# Patient Record
Sex: MALE | Race: WHITE | NOT HISPANIC OR LATINO | Employment: OTHER | URBAN - METROPOLITAN AREA
[De-identification: names, ages, dates, MRNs, and addresses within clinical notes are randomized per-mention and may not be internally consistent; named-entity substitution may affect disease eponyms.]

---

## 2017-12-11 ENCOUNTER — ALLSCRIPTS OFFICE VISIT (OUTPATIENT)
Dept: OTHER | Facility: OTHER | Age: 77
End: 2017-12-11

## 2017-12-11 LAB
BILIRUB UR QL STRIP: NORMAL
CLARITY UR: NORMAL
COLOR UR: YELLOW
GLUCOSE (HISTORICAL): NORMAL
HGB UR QL STRIP.AUTO: NORMAL
KETONES UR STRIP-MCNC: NORMAL MG/DL
LEUKOCYTE ESTERASE UR QL STRIP: NORMAL
NITRITE UR QL STRIP: NORMAL
PH UR STRIP.AUTO: 5.5 [PH]
PROT UR STRIP-MCNC: NORMAL MG/DL
SP GR UR STRIP.AUTO: 1.02
UROBILINOGEN UR QL STRIP.AUTO: 0.2

## 2018-01-22 VITALS
WEIGHT: 161 LBS | BODY MASS INDEX: 25.88 KG/M2 | HEIGHT: 66 IN | DIASTOLIC BLOOD PRESSURE: 80 MMHG | SYSTOLIC BLOOD PRESSURE: 110 MMHG

## 2018-12-11 DIAGNOSIS — C61 MALIGNANT NEOPLASM OF PROSTATE (HCC): ICD-10-CM

## 2018-12-12 ENCOUNTER — OFFICE VISIT (OUTPATIENT)
Dept: UROLOGY | Facility: MEDICAL CENTER | Age: 78
End: 2018-12-12
Payer: MEDICARE

## 2018-12-12 VITALS
WEIGHT: 158 LBS | SYSTOLIC BLOOD PRESSURE: 110 MMHG | HEIGHT: 65 IN | HEART RATE: 103 BPM | DIASTOLIC BLOOD PRESSURE: 80 MMHG | BODY MASS INDEX: 26.33 KG/M2

## 2018-12-12 DIAGNOSIS — Z85.46 HISTORY OF PROSTATE CANCER: Primary | ICD-10-CM

## 2018-12-12 DIAGNOSIS — R39.15 URGENCY OF URINATION: ICD-10-CM

## 2018-12-12 LAB
SL AMB  POCT GLUCOSE, UA: NORMAL
SL AMB LEUKOCYTE ESTERASE,UA: NORMAL
SL AMB POCT BILIRUBIN,UA: NORMAL
SL AMB POCT BLOOD,UA: NORMAL
SL AMB POCT CLARITY,UA: CLEAR
SL AMB POCT COLOR,UA: YELLOW
SL AMB POCT KETONES,UA: NORMAL
SL AMB POCT NITRITE,UA: NORMAL
SL AMB POCT PH,UA: 6
SL AMB POCT SPECIFIC GRAVITY,UA: 1.01
SL AMB POCT URINE PROTEIN: NORMAL
SL AMB POCT UROBILINOGEN: 0.2

## 2018-12-12 PROCEDURE — 81003 URINALYSIS AUTO W/O SCOPE: CPT | Performed by: UROLOGY

## 2018-12-12 PROCEDURE — 99214 OFFICE O/P EST MOD 30 MIN: CPT | Performed by: UROLOGY

## 2018-12-12 RX ORDER — ASPIRIN 81 MG/1
TABLET ORAL
COMMUNITY

## 2018-12-12 RX ORDER — LOVASTATIN 20 MG/1
TABLET ORAL
COMMUNITY

## 2018-12-12 RX ORDER — FERROUS SULFATE 325(65) MG
65 TABLET ORAL
COMMUNITY

## 2018-12-12 RX ORDER — BUSPIRONE HYDROCHLORIDE 15 MG/1
15 TABLET ORAL 2 TIMES DAILY
Refills: 0 | COMMUNITY
Start: 2018-09-07

## 2018-12-12 RX ORDER — OXYBUTYNIN CHLORIDE 10 MG/1
10 TABLET, EXTENDED RELEASE ORAL
Qty: 90 TABLET | Refills: 3 | Status: SHIPPED | OUTPATIENT
Start: 2018-12-12 | End: 2019-03-14 | Stop reason: SINTOL

## 2018-12-12 RX ORDER — CYCLOSPORINE 0.5 MG/ML
EMULSION OPHTHALMIC
COMMUNITY

## 2018-12-12 RX ORDER — ATENOLOL 25 MG/1
TABLET ORAL
COMMUNITY

## 2018-12-12 RX ORDER — SERTRALINE HYDROCHLORIDE 100 MG/1
200 TABLET, FILM COATED ORAL
COMMUNITY
Start: 2018-09-12

## 2018-12-12 RX ORDER — TAMSULOSIN HYDROCHLORIDE 0.4 MG/1
CAPSULE ORAL
COMMUNITY
End: 2018-12-12 | Stop reason: SDUPTHER

## 2018-12-12 RX ORDER — SERTRALINE HYDROCHLORIDE 100 MG/1
200 TABLET, FILM COATED ORAL
COMMUNITY
Start: 2018-12-12 | End: 2018-12-12 | Stop reason: SDUPTHER

## 2018-12-12 RX ORDER — TAMSULOSIN HYDROCHLORIDE 0.4 MG/1
0.4 CAPSULE ORAL
Qty: 90 CAPSULE | Refills: 3 | Status: SHIPPED | OUTPATIENT
Start: 2018-12-12 | End: 2021-02-09 | Stop reason: SDUPTHER

## 2018-12-12 NOTE — PROGRESS NOTES
Assessment/Plan:  1  History of prostate cancer-PSA is 0 25 without any evidence of disease recurrence  2  Urinary urgency and frequency-the patient will be started on oxybutynin XL 10 mg p o  Daily and return in 3 months for measurement of postvoid residual volume uroflow min treat and cystoscopy  No problem-specific Assessment & Plan notes found for this encounter  Diagnoses and all orders for this visit:    History of prostate cancer  -     POCT urine dip auto non-scope  -     tamsulosin (FLOMAX) 0 4 mg; Take 1 capsule (0 4 mg total) by mouth daily with dinner    Urgency of urination  -     oxybutynin (DITROPAN-XL) 10 MG 24 hr tablet; Take 1 tablet (10 mg total) by mouth daily at bedtime  -     Cystoscopy; Future  -     Uroflow w/ post void residual; Future    Other orders  -     aspirin (ADULT ASPIRIN EC LOW STRENGTH) 81 mg EC tablet; Take by mouth  -     atenolol (TENORMIN) 25 mg tablet; Take by mouth  -     busPIRone (BUSPAR) 30 MG tablet; Take 30 mg by mouth 2 (two) times a day  -     Cholecalciferol 1000 units capsule; Take 3,000 Units by mouth  -     lovastatin (MEVACOR) 20 mg tablet; Take by mouth  -     cycloSPORINE (RESTASIS) 0 05 % ophthalmic emulsion; Apply to eye  -     sertraline (ZOLOFT) 100 mg tablet; Take 200 mg by mouth  -     Discontinue: tamsulosin (FLOMAX) 0 4 mg; Take by mouth  -     ferrous sulfate 325 (65 Fe) mg tablet; Take 65 mg by mouth  -     Discontinue: sertraline (ZOLOFT) 100 mg tablet; Take 200 mg by mouth          Subjective:      Patient ID: Daiana Marsh is a 66 y o  male  Chief complaint:  History of prostate cancer status post radiation therapy approximately 5 years ago  History of present illness: This is a 28-year-old male underwent radiation therapy for prostate cancer 5 years ago and returns for follow-up denying any bony pain abdominal pain unexplained weight loss dysuria or gross hematuria    The patient does note some urinary urgency and urge incontinence which is problematic to him  He does note some frequency urgency and weakening of the urinary stream   This has improved somewhat on tamsulosin with an AUA symptom score of 10  The patient does request medication for his urgency and occasional urge incontinence  The following portions of the patient's history were reviewed and updated as appropriate: allergies, current medications, past family history, past medical history, past social history, past surgical history and problem list     Review of Systems   Constitutional: Positive for fatigue  HENT: Negative  Respiratory: Negative  Cardiovascular: Negative  Gastrointestinal: Negative  Endocrine: Negative  Genitourinary: Positive for frequency and urgency  Musculoskeletal: Positive for myalgias  Neurological: Negative  Objective:      /80 (BP Location: Left arm, Patient Position: Sitting)   Pulse 103   Ht 5' 5" (1 651 m)   Wt 71 7 kg (158 lb)   BMI 26 29 kg/m²          Physical Exam   Constitutional: He is oriented to person, place, and time  He appears well-nourished  No distress  HENT:   Head: Atraumatic  Eyes: EOM are normal    Neck: Neck supple  Pulmonary/Chest: Effort normal  No respiratory distress  Abdominal: Soft  Neurological: He is alert and oriented to person, place, and time  Psychiatric: He has a normal mood and affect  His behavior is normal  Judgment and thought content normal    Vitals reviewed

## 2018-12-12 NOTE — LETTER
December 12, 2018     Carly Hui MD  [address] [id number]    Patient: Radha Tamayo   YOB: 1940   Date of Visit: 12/12/2018       Dear Dr Tucker Cunningham: Thank you for referring Radha Tamayo to me for evaluation  Below are my notes for this consultation  If you have questions, please do not hesitate to call me  I look forward to following your patient along with you  Sincerely,        Kecia Shabazz MD        CC: No Recipients  Kecia Shabazz MD  12/12/2018  4:59 PM  Sign at close encounter  Assessment/Plan:  1  History of prostate cancer-PSA is 0 25 without any evidence of disease recurrence  2  Urinary urgency and frequency-the patient will be started on oxybutynin XL 10 mg p o  Daily and return in 3 months for measurement of postvoid residual volume uroflow min treat and cystoscopy  No problem-specific Assessment & Plan notes found for this encounter  Diagnoses and all orders for this visit:    History of prostate cancer  -     POCT urine dip auto non-scope  -     tamsulosin (FLOMAX) 0 4 mg; Take 1 capsule (0 4 mg total) by mouth daily with dinner    Urgency of urination  -     oxybutynin (DITROPAN-XL) 10 MG 24 hr tablet; Take 1 tablet (10 mg total) by mouth daily at bedtime  -     Cystoscopy; Future  -     Uroflow w/ post void residual; Future    Other orders  -     aspirin (ADULT ASPIRIN EC LOW STRENGTH) 81 mg EC tablet; Take by mouth  -     atenolol (TENORMIN) 25 mg tablet; Take by mouth  -     busPIRone (BUSPAR) 30 MG tablet; Take 30 mg by mouth 2 (two) times a day  -     Cholecalciferol 1000 units capsule; Take 3,000 Units by mouth  -     lovastatin (MEVACOR) 20 mg tablet; Take by mouth  -     cycloSPORINE (RESTASIS) 0 05 % ophthalmic emulsion; Apply to eye  -     sertraline (ZOLOFT) 100 mg tablet; Take 200 mg by mouth  -     Discontinue: tamsulosin (FLOMAX) 0 4 mg; Take by mouth  -     ferrous sulfate 325 (65 Fe) mg tablet;  Take 65 mg by mouth  -     Discontinue: sertraline (ZOLOFT) 100 mg tablet; Take 200 mg by mouth          Subjective:      Patient ID: Elieser Esquivel is a 66 y o  male  Chief complaint:  History of prostate cancer status post radiation therapy approximately 5 years ago  History of present illness: This is a 77-year-old male underwent radiation therapy for prostate cancer 5 years ago and returns for follow-up denying any bony pain abdominal pain unexplained weight loss dysuria or gross hematuria  The patient does note some urinary urgency and urge incontinence which is problematic to him  He does note some frequency urgency and weakening of the urinary stream   This has improved somewhat on tamsulosin with an AUA symptom score of 10  The patient does request medication for his urgency and occasional urge incontinence  The following portions of the patient's history were reviewed and updated as appropriate: allergies, current medications, past family history, past medical history, past social history, past surgical history and problem list     Review of Systems   Constitutional: Positive for fatigue  HENT: Negative  Respiratory: Negative  Cardiovascular: Negative  Gastrointestinal: Negative  Endocrine: Negative  Genitourinary: Positive for frequency and urgency  Musculoskeletal: Positive for myalgias  Neurological: Negative  Objective:      /80 (BP Location: Left arm, Patient Position: Sitting)   Pulse 103   Ht 5' 5" (1 651 m)   Wt 71 7 kg (158 lb)   BMI 26 29 kg/m²           Physical Exam   Constitutional: He is oriented to person, place, and time  He appears well-nourished  No distress  HENT:   Head: Atraumatic  Eyes: EOM are normal    Neck: Neck supple  Pulmonary/Chest: Effort normal  No respiratory distress  Abdominal: Soft  Neurological: He is alert and oriented to person, place, and time  Psychiatric: He has a normal mood and affect   His behavior is normal  Judgment and thought content normal    Vitals reviewed

## 2019-03-06 RX ORDER — TADALAFIL 20 MG/1
TABLET ORAL
Refills: 0 | COMMUNITY
Start: 2019-01-08 | End: 2021-02-09

## 2019-03-14 ENCOUNTER — PROCEDURE VISIT (OUTPATIENT)
Dept: UROLOGY | Facility: MEDICAL CENTER | Age: 79
End: 2019-03-14
Payer: MEDICARE

## 2019-03-14 VITALS
DIASTOLIC BLOOD PRESSURE: 80 MMHG | WEIGHT: 154 LBS | SYSTOLIC BLOOD PRESSURE: 118 MMHG | BODY MASS INDEX: 25.66 KG/M2 | HEIGHT: 65 IN | HEART RATE: 104 BPM

## 2019-03-14 DIAGNOSIS — R39.15 URGENCY OF URINATION: Primary | ICD-10-CM

## 2019-03-14 DIAGNOSIS — Z85.46 HISTORY OF PROSTATE CANCER: ICD-10-CM

## 2019-03-14 LAB
POST-VOID RESIDUAL VOLUME, ML POC: 14 ML
SL AMB  POCT GLUCOSE, UA: NORMAL
SL AMB LEUKOCYTE ESTERASE,UA: NORMAL
SL AMB POCT BILIRUBIN,UA: NORMAL
SL AMB POCT BLOOD,UA: NORMAL
SL AMB POCT CLARITY,UA: CLEAR
SL AMB POCT COLOR,UA: YELLOW
SL AMB POCT KETONES,UA: NORMAL
SL AMB POCT NITRITE,UA: NORMAL
SL AMB POCT PH,UA: 5.5
SL AMB POCT SPECIFIC GRAVITY,UA: 1.02
SL AMB POCT URINE PROTEIN: NORMAL
SL AMB POCT UROBILINOGEN: 0.2

## 2019-03-14 PROCEDURE — 51798 US URINE CAPACITY MEASURE: CPT | Performed by: UROLOGY

## 2019-03-14 PROCEDURE — 52000 CYSTOURETHROSCOPY: CPT | Performed by: UROLOGY

## 2019-03-14 PROCEDURE — 81003 URINALYSIS AUTO W/O SCOPE: CPT | Performed by: UROLOGY

## 2019-03-14 PROCEDURE — 99213 OFFICE O/P EST LOW 20 MIN: CPT | Performed by: UROLOGY

## 2019-03-14 RX ORDER — SOLIFENACIN SUCCINATE 10 MG/1
10 TABLET, FILM COATED ORAL DAILY
Qty: 90 TABLET | Refills: 3 | Status: SHIPPED | OUTPATIENT
Start: 2019-03-14 | End: 2021-02-09 | Stop reason: SDUPTHER

## 2019-03-14 RX ORDER — SULFAMETHOXAZOLE AND TRIMETHOPRIM 800; 160 MG/1; MG/1
1 TABLET ORAL EVERY 12 HOURS SCHEDULED
Qty: 4 TABLET | Refills: 0 | Status: SHIPPED | OUTPATIENT
Start: 2019-03-14 | End: 2019-03-16

## 2019-03-14 NOTE — PROGRESS NOTES
Assessment/Plan:   1  Adenocarcinoma the prostate-PSA has been stable for many years and will be repeated in December of 2019  2  Urinary urgency-controlled with oxybutynin however the patient has side effects of dry mouth  VESIcare 10 mg p  o  daily will be ordered an oxybutynin discontinued  If VESIcare works well to control the patient's symptoms consideration towards discontinuing tamsulosin will take place  Diagnoses and all orders for this visit:    Urgency of urination  -     POCT Measure PVR  -     POCT urine dip auto non-scope  -     solifenacin (VESICARE) 10 MG tablet; Take 1 tablet (10 mg total) by mouth daily  -     sulfamethoxazole-trimethoprim (BACTRIM DS) 800-160 mg per tablet; Take 1 tablet by mouth every 12 (twelve) hours for 2 days  -     POCT Measure PVR; Future    History of prostate cancer  -     PSA Total, Diagnostic; Future  -     Comprehensive metabolic panel; Future          Subjective:     Patient ID: Adele Hatfield is a 78 y o  male  Chief complaint:  Urgency of urination, enlarged prostate gland     History of present illness: This is a 66-year-old male underwent radiation therapy for prostate cancer 5 years ago and returns for follow-up denying any bony pain abdominal pain unexplained weight loss dysuria or gross hematuria  The patient does note some urinary urgency and urge incontinence which is problematic to him  He does note some frequency urgency and weakening of the urinary stream   This has improved somewhat on tamsulosin with an AUA symptom score of 10  The patient does request medication for his urgency and occasional urge incontinence  The patient was placed on oxybutynin XL 10 mg p o  daily with good control of his urinary urgency and presents today for cystourethroscopy to evaluate the bladder outlet for possible Uro lift and rule out any intravesical abnormalities    The patient does complain of dry mouth from oxybutynin and has requested an alternative medication  Review of Systems   Constitutional: Negative  HENT: Negative  Eyes: Negative  Respiratory: Negative  Cardiovascular: Negative  Gastrointestinal: Negative  Endocrine: Negative  Genitourinary:        See HPI   Musculoskeletal: Positive for myalgias  Neurological: Negative  Psychiatric/Behavioral: Negative  Objective:     Physical Exam   Constitutional: He is oriented to person, place, and time  He appears well-developed and well-nourished  HENT:   Head: Normocephalic and atraumatic  Eyes: EOM are normal    Neck: Neck supple  Cardiovascular: Normal rate, regular rhythm and normal heart sounds  Pulmonary/Chest: Effort normal and breath sounds normal  No respiratory distress  Abdominal: Soft  He exhibits no distension  There is no tenderness  There is no guarding  Genitourinary: Penis normal    Neurological: He is alert and oriented to person, place, and time  Psychiatric: He has a normal mood and affect  His behavior is normal  Judgment and thought content normal    Vitals reviewed  Cystoscopy  Date/Time: 3/14/2019 2:13 PM  Performed by: Brody Ruiz MD  Authorized by: Brody Ruiz MD     Procedure details: cystoscopy    Patient tolerance: Patient tolerated the procedure well with no immediate complications    Additional Procedure Details: The patient was placed in lithotomy position his urethral meatus prepped with Betadine  2% lidocaine lubricant was placed indwelling in the urethral lumen for 5 minutes and cystourethroscopy took place  The anterior urethra was within normal limits without stricture lesion  The prostatic urethra revealed minimal bilobar enlargement of the lateral lobes of the prostate with visual occlusion of the bladder outlet being only mild  The patient's bladder however was moderately to severely trabeculated with multiple posterior and lateral wall diverticula of small volume    No intrinsic lesions or extrinsic mass compression was noted  Ureteral orifices were normal position and configuration bilaterally with clear efflux bilaterally  The patient tolerated the procedure well without complication

## 2019-03-14 NOTE — LETTER
March 14, 2019     Monty Murphy MD  [address] [id number]    Patient: Jannette Lemus   YOB: 1940   Date of Visit: 3/14/2019       Dear Dr Vivek Hurd: Thank you for referring Jannette Lemus to me for evaluation  Below are my notes for this consultation  If you have questions, please do not hesitate to call me  I look forward to following your patient along with you  Sincerely,        Alee Haynes MD        CC: No Recipients  Alee Haynes MD  3/14/2019  2:15 PM  Sign at close encounter  Assessment/Plan:   1  Adenocarcinoma the prostate-PSA has been stable for many years and will be repeated in December of 2019  2  Urinary urgency-controlled with oxybutynin however the patient has side effects of dry mouth  VESIcare 10 mg p  o  daily will be ordered an oxybutynin discontinued  If VESIcare works well to control the patient's symptoms consideration towards discontinuing tamsulosin will take place  Diagnoses and all orders for this visit:    Urgency of urination  -     POCT Measure PVR  -     POCT urine dip auto non-scope  -     solifenacin (VESICARE) 10 MG tablet; Take 1 tablet (10 mg total) by mouth daily  -     sulfamethoxazole-trimethoprim (BACTRIM DS) 800-160 mg per tablet; Take 1 tablet by mouth every 12 (twelve) hours for 2 days  -     POCT Measure PVR; Future    History of prostate cancer  -     PSA Total, Diagnostic; Future  -     Comprehensive metabolic panel; Future          Subjective:     Patient ID: Jannette Lemus is a 78 y o  male  Chief complaint:  Urgency of urination, enlarged prostate gland     History of present illness: This is a 77-year-old male underwent radiation therapy for prostate cancer 5 years ago and returns for follow-up denying any bony pain abdominal pain unexplained weight loss dysuria or gross hematuria  The patient does note some urinary urgency and urge incontinence which is problematic to him    He does note some frequency urgency and weakening of the urinary stream   This has improved somewhat on tamsulosin with an AUA symptom score of 10  The patient does request medication for his urgency and occasional urge incontinence  The patient was placed on oxybutynin XL 10 mg p o  daily with good control of his urinary urgency and presents today for cystourethroscopy to evaluate the bladder outlet for possible Uro lift and rule out any intravesical abnormalities  The patient does complain of dry mouth from oxybutynin and has requested an alternative medication  Review of Systems   Constitutional: Negative  HENT: Negative  Eyes: Negative  Respiratory: Negative  Cardiovascular: Negative  Gastrointestinal: Negative  Endocrine: Negative  Genitourinary:        See HPI   Musculoskeletal: Positive for myalgias  Neurological: Negative  Psychiatric/Behavioral: Negative  Objective:     Physical Exam   Constitutional: He is oriented to person, place, and time  He appears well-developed and well-nourished  HENT:   Head: Normocephalic and atraumatic  Eyes: EOM are normal    Neck: Neck supple  Cardiovascular: Normal rate, regular rhythm and normal heart sounds  Pulmonary/Chest: Effort normal and breath sounds normal  No respiratory distress  Abdominal: Soft  He exhibits no distension  There is no tenderness  There is no guarding  Genitourinary: Penis normal    Neurological: He is alert and oriented to person, place, and time  Psychiatric: He has a normal mood and affect  His behavior is normal  Judgment and thought content normal    Vitals reviewed  Cystoscopy  Date/Time: 3/14/2019 2:13 PM  Performed by: Vandana Lo MD  Authorized by: Vandana Lo MD     Procedure details: cystoscopy    Patient tolerance: Patient tolerated the procedure well with no immediate complications    Additional Procedure Details:  The patient was placed in lithotomy position his urethral meatus prepped with Betadine  2% lidocaine lubricant was placed indwelling in the urethral lumen for 5 minutes and cystourethroscopy took place  The anterior urethra was within normal limits without stricture lesion  The prostatic urethra revealed minimal bilobar enlargement of the lateral lobes of the prostate with visual occlusion of the bladder outlet being only mild  The patient's bladder however was moderately to severely trabeculated with multiple posterior and lateral wall diverticula of small volume  No intrinsic lesions or extrinsic mass compression was noted  Ureteral orifices were normal position and configuration bilaterally with clear efflux bilaterally  The patient tolerated the procedure well without complication

## 2019-12-13 RX ORDER — ALPRAZOLAM 0.25 MG/1
0.25 TABLET ORAL 2 TIMES DAILY PRN
COMMUNITY
Start: 2019-11-13

## 2019-12-18 ENCOUNTER — OFFICE VISIT (OUTPATIENT)
Dept: UROLOGY | Facility: MEDICAL CENTER | Age: 79
End: 2019-12-18
Payer: MEDICARE

## 2019-12-18 VITALS
HEIGHT: 65 IN | DIASTOLIC BLOOD PRESSURE: 70 MMHG | SYSTOLIC BLOOD PRESSURE: 120 MMHG | WEIGHT: 158 LBS | HEART RATE: 100 BPM | BODY MASS INDEX: 26.33 KG/M2

## 2019-12-18 DIAGNOSIS — R39.15 URGENCY OF URINATION: Primary | ICD-10-CM

## 2019-12-18 DIAGNOSIS — Z85.46 HISTORY OF PROSTATE CANCER: ICD-10-CM

## 2019-12-18 LAB
POST-VOID RESIDUAL VOLUME, ML POC: 7 ML
SL AMB  POCT GLUCOSE, UA: ABNORMAL
SL AMB LEUKOCYTE ESTERASE,UA: ABNORMAL
SL AMB POCT BILIRUBIN,UA: ABNORMAL
SL AMB POCT BLOOD,UA: ABNORMAL
SL AMB POCT CLARITY,UA: CLEAR
SL AMB POCT COLOR,UA: YELLOW
SL AMB POCT KETONES,UA: ABNORMAL
SL AMB POCT NITRITE,UA: ABNORMAL
SL AMB POCT PH,UA: 5
SL AMB POCT SPECIFIC GRAVITY,UA: 1.02
SL AMB POCT URINE PROTEIN: 30
SL AMB POCT UROBILINOGEN: 0.2

## 2019-12-18 PROCEDURE — 81003 URINALYSIS AUTO W/O SCOPE: CPT | Performed by: UROLOGY

## 2019-12-18 PROCEDURE — 51798 US URINE CAPACITY MEASURE: CPT | Performed by: UROLOGY

## 2019-12-18 PROCEDURE — 99214 OFFICE O/P EST MOD 30 MIN: CPT | Performed by: UROLOGY

## 2019-12-18 NOTE — PROGRESS NOTES
Assessment/Plan: 1  No carcinoma of the prostate -PSA stable with no evidence of recurrence    2  Urinary urgency- controlled on VESIcare 10 mg p o  Daily  AUA symptom score 10 with nocturia twice nightly and 3/5 urgency and frequency  No side effects from VESIcare  The patient will temporarily stop tamsulosin/Flomax to see if his stream changes at all  If he has no problems off of tamsulosin he will remain off of that otherwise he will reinstituted if his urinary stream becomes weaker  No problem-specific Assessment & Plan notes found for this encounter  Diagnoses and all orders for this visit:    Urgency of urination  -     POCT urine dip auto non-scope  -     POCT Measure PVR    History of prostate cancer  -     POCT urine dip auto non-scope  -     POCT Measure PVR    Other orders  -     ALPRAZolam (XANAX) 0 25 mg tablet; Take 0 25 mg by mouth 2 (two) times a day as needed          Subjective:      Patient ID: Blaise Esquivel is a 78 y o  male  HPI    68-year-old male who underwent radiation therapy approximately 6 years ago returns for follow-up of his adenocarcinoma the prostate denying any bone pain abdominal pain weight loss gross hematuria noting urinary urgency and frequency markedly less on VESIcare 10 mg p o  Daily with no further urgency incontinence  He denies any weakening of the urinary stream and is on tamsulosin for that although he will come off of that to see if his stream changes whatsoever  Overall the patient notes improved urination since anticholinergics were initiated noting cessation of his dry mouth which was the major side effect of oxybutynin  Over the past 6 months he notes a good urinary stream minimal urinary urgency and frequency and resolution of his urge incontinence    The following portions of the patient's history were reviewed and updated as appropriate: allergies, current medications, past family history, past medical history, past social history, past surgical history and problem list     Review of Systems   Genitourinary:        See hpi   Musculoskeletal: Positive for myalgias  All other systems reviewed and are negative  Objective:      /70   Pulse 100   Ht 5' 5" (1 651 m)   Wt 71 7 kg (158 lb)   BMI 26 29 kg/m²          Physical Exam   Constitutional: He is oriented to person, place, and time  He appears well-developed and well-nourished  No distress  HENT:   Head: Normocephalic and atraumatic  Eyes: EOM are normal    Neck: Neck supple  Cardiovascular: Normal rate  Pulmonary/Chest: Effort normal  No respiratory distress  Abdominal: Soft  Musculoskeletal: Normal range of motion  Neurological: He is alert and oriented to person, place, and time  Psychiatric: He has a normal mood and affect  His behavior is normal  Judgment and thought content normal    Vitals reviewed

## 2021-02-09 ENCOUNTER — OFFICE VISIT (OUTPATIENT)
Dept: UROLOGY | Facility: MEDICAL CENTER | Age: 81
End: 2021-02-09
Payer: MEDICARE

## 2021-02-09 VITALS
BODY MASS INDEX: 24.25 KG/M2 | HEIGHT: 68 IN | DIASTOLIC BLOOD PRESSURE: 70 MMHG | SYSTOLIC BLOOD PRESSURE: 124 MMHG | WEIGHT: 160 LBS

## 2021-02-09 DIAGNOSIS — Z85.46 HISTORY OF PROSTATE CANCER: ICD-10-CM

## 2021-02-09 DIAGNOSIS — N52.35 ERECTILE DYSFUNCTION FOLLOWING RADIATION THERAPY: Primary | ICD-10-CM

## 2021-02-09 DIAGNOSIS — R39.15 URGENCY OF URINATION: ICD-10-CM

## 2021-02-09 PROCEDURE — 99214 OFFICE O/P EST MOD 30 MIN: CPT | Performed by: UROLOGY

## 2021-02-09 RX ORDER — TAMSULOSIN HYDROCHLORIDE 0.4 MG/1
0.4 CAPSULE ORAL
Qty: 90 CAPSULE | Refills: 3 | Status: SHIPPED | OUTPATIENT
Start: 2021-02-09 | End: 2021-09-09 | Stop reason: SDUPTHER

## 2021-02-09 RX ORDER — SOLIFENACIN SUCCINATE 10 MG/1
10 TABLET, FILM COATED ORAL DAILY
Qty: 90 TABLET | Refills: 3 | Status: SHIPPED | OUTPATIENT
Start: 2021-02-09 | End: 2022-02-16 | Stop reason: SDUPTHER

## 2021-02-09 NOTE — PROGRESS NOTES
Assessment/Plan:  1  Adenocarcinoma of the prostate - status post radiation therapy, PSA low and stable without disease recurrence  Repeat PSA 1 year     2  Urgency of urination- continue on tamsulosin and VESIcare     3  Sexual dysfunction-erectile dysfunction -patient not using Cialis is not sexually active  No problem-specific Assessment & Plan notes found for this encounter  Diagnoses and all orders for this visit:    History of prostate cancer  -     tamsulosin (FLOMAX) 0 4 mg; Take 1 capsule (0 4 mg total) by mouth daily with dinner  -     PSA Total, Diagnostic; Future  -     Comprehensive metabolic panel; Future    Urgency of urination  -     solifenacin (VESICARE) 10 MG tablet; Take 1 tablet (10 mg total) by mouth daily          Subjective:      Patient ID: Mara King is a 80 y o  male  HPI   26-year-old male well known to me undergoing radiation therapy for prostate cancer 7 years ago returns for follow-up with a stable low PSA  Patient had some urinary urgency and continues to take tamsulosin with an AUA symptom score that is stable adequate  He is pleased with his present voiding pattern  The patient continues to utilize VESIcare as well  He is no longer sexually active and is not utilizing Cialis  The following portions of the patient's history were reviewed and updated as appropriate: allergies, current medications, past family history, past medical history, past social history, past surgical history and problem list     Review of Systems   Genitourinary: Positive for urgency  AUA symptom score 13   All other systems reviewed and are negative  Objective:      /70   Ht 5' 8" (1 727 m)   Wt 72 6 kg (160 lb)   BMI 24 33 kg/m²          Physical Exam  Vitals signs reviewed  Constitutional:       General: He is not in acute distress  Appearance: Normal appearance  He is normal weight  He is not ill-appearing, toxic-appearing or diaphoretic     HENT:      Head: Normocephalic and atraumatic  Nose: Nose normal       Mouth/Throat:      Mouth: Mucous membranes are moist    Eyes:      Extraocular Movements: Extraocular movements intact  Pulmonary:      Effort: Pulmonary effort is normal  No respiratory distress  Abdominal:      Palpations: Abdomen is soft  Skin:     General: Skin is dry  Neurological:      General: No focal deficit present  Mental Status: He is alert and oriented to person, place, and time  Mental status is at baseline  Psychiatric:         Mood and Affect: Mood normal          Behavior: Behavior normal          Thought Content:  Thought content normal          Judgment: Judgment normal

## 2021-09-09 DIAGNOSIS — Z85.46 HISTORY OF PROSTATE CANCER: ICD-10-CM

## 2021-09-09 RX ORDER — TAMSULOSIN HYDROCHLORIDE 0.4 MG/1
0.4 CAPSULE ORAL
Qty: 90 CAPSULE | Refills: 2 | Status: SHIPPED | OUTPATIENT
Start: 2021-09-09 | End: 2022-02-16 | Stop reason: SDUPTHER

## 2021-09-09 NOTE — TELEPHONE ENCOUNTER
An Auto-fax Refill Request for Tamsulosin 0 4mg was received from [facility] mail order pharmacy  The patient has an upcoming office visit scheduled for 2/16/22 with Dr Pipe Watts in the [facility] location but will run out of medication until then    Request for same, 90 day supply with 2 refills was queued and forwarded to the Advanced Practitioner covering the [facility] location for approval

## 2022-02-16 ENCOUNTER — OFFICE VISIT (OUTPATIENT)
Dept: UROLOGY | Facility: MEDICAL CENTER | Age: 82
End: 2022-02-16
Payer: MEDICARE

## 2022-02-16 VITALS
SYSTOLIC BLOOD PRESSURE: 110 MMHG | BODY MASS INDEX: 29.45 KG/M2 | HEIGHT: 61 IN | DIASTOLIC BLOOD PRESSURE: 80 MMHG | WEIGHT: 156 LBS | HEART RATE: 101 BPM

## 2022-02-16 DIAGNOSIS — C61 MALIGNANT NEOPLASM OF PROSTATE (HCC): ICD-10-CM

## 2022-02-16 DIAGNOSIS — N52.35 ERECTILE DYSFUNCTION FOLLOWING RADIATION THERAPY: ICD-10-CM

## 2022-02-16 DIAGNOSIS — R39.15 URGENCY OF URINATION: ICD-10-CM

## 2022-02-16 DIAGNOSIS — Z85.46 HISTORY OF PROSTATE CANCER: Primary | ICD-10-CM

## 2022-02-16 LAB
SL AMB  POCT GLUCOSE, UA: ABNORMAL
SL AMB LEUKOCYTE ESTERASE,UA: ABNORMAL
SL AMB POCT BILIRUBIN,UA: ABNORMAL
SL AMB POCT BLOOD,UA: ABNORMAL
SL AMB POCT CLARITY,UA: CLEAR
SL AMB POCT COLOR,UA: YELLOW
SL AMB POCT KETONES,UA: ABNORMAL
SL AMB POCT NITRITE,UA: ABNORMAL
SL AMB POCT PH,UA: 5
SL AMB POCT SPECIFIC GRAVITY,UA: 1.02
SL AMB POCT URINE PROTEIN: ABNORMAL
SL AMB POCT UROBILINOGEN: 0.2

## 2022-02-16 PROCEDURE — 81003 URINALYSIS AUTO W/O SCOPE: CPT | Performed by: UROLOGY

## 2022-02-16 PROCEDURE — 99214 OFFICE O/P EST MOD 30 MIN: CPT | Performed by: UROLOGY

## 2022-02-16 RX ORDER — SOLIFENACIN SUCCINATE 10 MG/1
10 TABLET, FILM COATED ORAL DAILY
Qty: 90 TABLET | Refills: 3 | Status: SHIPPED | OUTPATIENT
Start: 2022-02-16

## 2022-02-16 RX ORDER — TAMSULOSIN HYDROCHLORIDE 0.4 MG/1
0.4 CAPSULE ORAL
Qty: 90 CAPSULE | Refills: 3 | Status: SHIPPED | OUTPATIENT
Start: 2022-02-16

## 2022-02-16 RX ORDER — TAMSULOSIN HYDROCHLORIDE 0.4 MG/1
0.4 CAPSULE ORAL
Qty: 90 CAPSULE | Refills: 2 | Status: SHIPPED | OUTPATIENT
Start: 2022-02-16 | End: 2022-02-16

## 2022-02-16 NOTE — PROGRESS NOTES
Assessment/Plan:  1  Adenocarcinoma of the prostate - status post radiation therapy, PSA low and stable without disease recurrence  Repeat PSA 1 year      2  Urgency of urination- continue on tamsulosin and VESIcare      3  Sexual dysfunction-erectile dysfunction -patient not using Cialis is not sexually active  No problem-specific Assessment & Plan notes found for this encounter  Diagnoses and all orders for this visit:    History of prostate cancer  -     PSA Total, Diagnostic; Future  -     Comprehensive metabolic panel; Future    Urgency of urination    Erectile dysfunction following radiation therapy          Subjective:      Patient ID: Cherelle Stone is a 80 y o  male  HPI  [de-identified] year-old male well known to me undergoing radiation therapy for prostate cancer 8 years ago returns for follow-up with a stable low PSA  Patient had some urinary urgency and continues to take tamsulosin with an AUA symptom score of 11 that is stable and adequate  He is pleased with his present voiding pattern  The patient continues to utilize VESIcare as well  He is no longer sexually active and is not utilizing Cialis  The following portions of the patient's history were reviewed and updated as appropriate: allergies, current medications, past family history, past medical history, past social history, past surgical history and problem list     Review of Systems   Genitourinary: Positive for frequency and urgency  AUA symptom score 11 with 2/5 for frequency and urgency 3/5 for feelings of incomplete bladder emptying 1/5 for weakening of the urinary stream straining and nocturia   All other systems reviewed and are negative  Objective: There were no vitals taken for this visit  Physical Exam  Vitals reviewed  Constitutional:       General: He is not in acute distress  Appearance: Normal appearance  He is normal weight  He is not toxic-appearing or diaphoretic     HENT:      Head: Normocephalic and atraumatic  Nose: Nose normal    Eyes:      Extraocular Movements: Extraocular movements intact  Pulmonary:      Effort: Pulmonary effort is normal  No respiratory distress  Neurological:      Mental Status: He is alert and oriented to person, place, and time  Psychiatric:         Mood and Affect: Mood normal          Behavior: Behavior normal          Thought Content:  Thought content normal          Judgment: Judgment normal

## 2022-02-23 ENCOUNTER — NURSE TRIAGE (OUTPATIENT)
Dept: OTHER | Facility: OTHER | Age: 82
End: 2022-02-23

## 2022-02-23 NOTE — TELEPHONE ENCOUNTER
Call placed to patient and spoke with him  Informed him of the recommendations of the AP  Pt is aware and understands these recommendations and had no further questions

## 2022-02-23 NOTE — TELEPHONE ENCOUNTER
Reason for Disposition   Caller has NON-URGENT medicine question about med that PCP or specialist prescribed and triager unable to answer question    Answer Assessment - Initial Assessment Questions  1  NAME of MEDICATION: "What medicine are you calling about?"      Vesicare    2  QUESTION: "What is your question?" (e g , medication refill, side effect)    Patient called regarding vesicare and doesn't believe he ever took it in the past and is concerned about the side effects of this new medication  He said he has had dry mouth with a previous medication and isn't sure if this is the same medication that he too last year and then stopped taking  He is currently taking his Flomax but will not take the Vesicare until he hears back from the office  He would appreciate a callback to discuss      Protocols used: MEDICATION QUESTION CALL-ADULT-[place]

## 2022-02-23 NOTE — TELEPHONE ENCOUNTER
Per Dr Cara Liang documentation, pt previously on oxybutynin in 2018 but has been managed on Vesicare 10 mg po daily since 2019  Not sure if patient is confused but there is documentation from every office visit since 2019 that he is on Vesicare 10 mg po and feels well controlled  Usually Vesicare does have less side effects than oxybutynin  Recommend patient remain well hydrated as well to help prevent  Thanks!

## 2022-02-23 NOTE — TELEPHONE ENCOUNTER
Regarding: New medication question  ----- Message from Kiley Hayward sent at 2/23/2022  2:00 PM EST -----  "I was prescribed a new medication for overactive bladder, and I have had problems taking that type of medication in the past   I am worried about getting dry mouth and other side effects "

## 2023-05-08 DIAGNOSIS — Z85.46 HISTORY OF PROSTATE CANCER: ICD-10-CM

## 2023-05-08 RX ORDER — TAMSULOSIN HYDROCHLORIDE 0.4 MG/1
CAPSULE ORAL
Qty: 90 CAPSULE | Refills: 3 | Status: SHIPPED | OUTPATIENT
Start: 2023-05-08

## 2024-05-01 DIAGNOSIS — Z85.46 HISTORY OF PROSTATE CANCER: ICD-10-CM

## 2024-05-02 RX ORDER — TAMSULOSIN HYDROCHLORIDE 0.4 MG/1
CAPSULE ORAL
Qty: 90 CAPSULE | Refills: 3 | Status: SHIPPED | OUTPATIENT
Start: 2024-05-02

## 2024-05-02 NOTE — TELEPHONE ENCOUNTER
Refill must be reviewed and completed by the office or provider. The refill is unable to be approved or denied by the medication management team.    Patient not seen since 2/16/22